# Patient Record
(demographics unavailable — no encounter records)

---

## 2024-12-03 NOTE — PHYSICAL EXAM
[No Acute Distress] : no acute distress [Normal Oropharynx] : normal oropharynx [Normal Appearance] : normal appearance [No Neck Mass] : no neck mass [Normal Rate/Rhythm] : normal rate/rhythm [Normal S1, S2] : normal s1, s2 [No Murmurs] : no murmurs [No Resp Distress] : no resp distress [Clear to Auscultation Bilaterally] : clear to auscultation bilaterally [Normal to Percussion] : normal to percussion [No Abnormalities] : no abnormalities [Benign] : benign [Not Tender] : not tender [Normal Gait] : normal gait [No Clubbing] : no clubbing [No Cyanosis] : no cyanosis [No Edema] : no edema [Normal Color/ Pigmentation] : normal color/ pigmentation [No Focal Deficits] : no focal deficits [Oriented x3] : oriented x3 [Normal Affect] : normal affect [TextBox_2] : Overweight

## 2024-12-03 NOTE — CONSULT LETTER
[Dear  ___] : Dear ~CAPO, [Consult Letter:] : I had the pleasure of evaluating your patient, [unfilled]. [Consult Closing:] : Thank you very much for allowing me to participate in the care of this patient.  If you have any questions, please do not hesitate to contact me. [Sincerely,] : Sincerely, [FreeTextEntry2] : Abbe Bird MD [FreeTextEntry3] : [facility]

## 2024-12-03 NOTE — HISTORY OF PRESENT ILLNESS
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 No recent resp. infections.  got flu and other vaccines   [TextBox_11] : 1 [TextBox_13] : 40 [YearQuit] : 2021

## 2024-12-03 NOTE — DISCUSSION/SUMMARY
[FreeTextEntry1] : Pulmonary nodules.  Large nodules most likely intrapulmonary lymph nodes based upon location and appearance.  Questionable increase in size. Reticular opacities mildly increased compared to September 2022.  Consider combined pulmonary fibrosis emphysema syndrome.  Consider interstitial lung disease.  No definitive evidence of rheumatic disease.  Clinically stable but decrease in function. Asbestos exposure probable. Restrictive physiology more likely related to body habitus than interstitial disease.  Both may be contributory. Former smoker

## 2024-12-03 NOTE — PROCEDURE
[FreeTextEntry1] : Rheumatology note reviewed and appreciated  12/03/2024 Pulmonary function testing There is a moderate ventilatory impairment in a combined obstructive and restrictive pattern. There is elevation in the RV/TLC ratio indicative of possible air trapping. There is a mild diffusion impairment.  Mild decrease in function compared to June 2024.    1 / 1 Wilbur Shook  Report date:11/2/2023 View Order (Report matches exam selected in Patient History pane)     ACC: [id number] EXAM: CT CHEST ORDERED BY: IVAN TAYLOR  PROCEDURE DATE: 10/26/2023    INTERPRETATION: Clinical indication: Follow-up of lung nodule, history of smoking.  Axial CT images of the chest are obtained without intravenous administration of contrast.  Comparison is made with the prior chest CT of September 20, 2022.  No enlarged axillary lymph nodes. Multiple small or benign appearing mediastinal and hilar lymph nodes are without significant interval change.  Heart size is normal. No pericardial or pleural effusions. Vascular calcifications with involvement of the aorta and the coronary arteries.  Evaluation of the upper abdomen demonstrate cholelithiasis. Relative decreased density of the liver as compared to the spleen suggestive of hepatic steatosis.  Evaluation of the lungs interval resolution of the previously seen clustered bilateral lower lobe and right upper lobe clustered tiny nodules due to impacted distal airways since September 20, 2022.  5 mm left lower lobe fissural nodule on image 85 of series 3 demonstrate minimal interval increase in size since September 20, 2022 given differences in technique. Right-sided 7 mm fissural nodule on image 86 of series 3 also has slightly increased in size. The other discrete appearing lung nodules largest within the left lower lobe measuring about 7 mm are unchanged.  Mild bilateral lower lung peripheral reticular or groundglass opacities within the lower lobes, right middle lobe and the lingula have slightly increased since September 20, 2022, of unclear etiology may be due to mild interstitial lung disease. Peripheral reticular or groundglass opacities within the dependent portions of the lower lobes in a paraspinal location are new since the prior study may be due to dependent atelectasis or mild interstitial lung disease.  No bronchiectasis or honeycombing.  No central endobronchial lesions. Saber-sheath configuration of the trachea related to the given history of smoking.  Degenerative changes of the spine.  IMPRESSION: Interval resolution of the previously seen bilateral clusters of impacted distal airways since September 20, 2022.  Mild bilateral mid to lower lung peripheral reticular or groundglass opacities which are either new or demonstrate interval slight progression since September 20, 2022 can be seen in the setting of interstitial lung disease.  2 bilateral fissural subcentimeter nodules possibly intrafissural lymph nodes slightly increased in size since September 20, 2022 and may be due to the increased conspicuity of the peripheral reticular opacities. Other bilateral discrete appearing subcentimeter pulmonary nodules are unchanged.  A 3 month follow-up noncontrast chest CT is recommended for complete evaluation of the above findings.  --- End of Report ---      WILBUR SHOOK MD; Attending Radiologist This document has been electronically signed. Nov 2 2023 3:58PM

## 2024-12-03 NOTE — CONSULT LETTER
[Dear  ___] : Dear ~CAPO, [Consult Letter:] : I had the pleasure of evaluating your patient, [unfilled]. [Consult Closing:] : Thank you very much for allowing me to participate in the care of this patient.  If you have any questions, please do not hesitate to contact me. [Sincerely,] : Sincerely, [FreeTextEntry2] : Abbe Bird MD [FreeTextEntry3] : Americ[facility]

## 2024-12-03 NOTE — PROCEDURE
[FreeTextEntry1] : Rheumatology note reviewed and appreciated  12/03/2024 Pulmonary function testing There is a moderate ventilatory impairment in a combined obstructive and restrictive pattern. There is elevation in the RV/TLC ratio indicative of possible air trapping. There is a mild diffusion impairment.  Mild decrease in function compared to June 2024.    1 / 1 Wilbur Shook  Report date:11/2/2023 View Order (Report matches exam selected in Patient History pane)     ACC: [id number] EXAM: CT CHEST ORDERED BY: IVAN TAYLOR  PROCEDURE DATE: 10/26/2023    INTERPRETATION: Clinical indication: Follow-up of lung nodule, history of smoking.  Axial CT images of the chest are obtained without intravenous administration of contrast.  Comparison is made with the prior chest CT of September 20, 2022.  No enlarged axillary lymph nodes. Multiple small or benign appearing mediastinal and hilar lymph nodes are without significant interval change.  Heart size is normal. No pericardial or pleural effusions. Vascular calcifications with involvement of the aorta and the coronary arteries.  Evaluation of the upper abdomen demonstrate cholelithiasis. Relative decreased density of the liver as compared to the spleen suggestive of hepatic steatosis.  Evaluation of the lungs interval resolution of the previously seen clustered bilateral lower lobe and right upper lobe clustered tiny nodules due to impacted distal airways since September 20, 2022.  5 mm left lower lobe fissural nodule on image 85 of series 3 demonstrate minimal interval increase in size since September 20, 2022 given differences in technique. Right-sided 7 mm fissural nodule on image 86 of series 3 also has slightly increased in size. The other discrete appearing lung nodules largest within the left lower lobe measuring about 7 mm are unchanged.  Mild bilateral lower lung peripheral reticular or groundglass opacities within the lower lobes, right middle lobe and the lingula have slightly increased since September 20, 2022, of unclear etiology may be due to mild interstitial lung disease. Peripheral reticular or groundglass opacities within the dependent portions of the lower lobes in a paraspinal location are new since the prior study may be due to dependent atelectasis or mild interstitial lung disease.  No bronchiectasis or honeycombing.  No central endobronchial lesions. Saber-sheath configuration of the trachea related to the given history of smoking.  Degenerative changes of the spine.  IMPRESSION: Interval resolution of the previously seen bilateral clusters of impacted distal airways since September 20, 2022.  Mild bilateral mid to lower lung peripheral reticular or groundglass opacities which are either new or demonstrate interval slight progression since September 20, 2022 can be seen in the setting of interstitial lung disease.  2 bilateral fissural subcentimeter nodules possibly intrafissural lymph nodes slightly increased in size since September 20, 2022 and may be due to the increased conspicuity of the peripheral reticular opacities. Other bilateral discrete appearing subcentimeter pulmonary nodules are unchanged.  A 3 month follow-up noncontrast chest CT is recommended for complete evaluation of the above findings.  --- End of Report ---      WILBUR SHOOK MD; Attending Radiologist This document has been electronically signed. Nov 2 2023 3:58PM

## 2024-12-03 NOTE — ASSESSMENT
[FreeTextEntry1] : Follow-up CAT scan.  High-resolution CT. Patient would benefit from program of exercise and weight loss. Continue CPAP. Will review CT and make further recommendations. Follow-up in 3 months or sooner on a as needed basis. All discussed with patient.   35 minutes spent in evaluation management and review of studies.

## 2025-03-03 NOTE — CONSULT LETTER
[Dear  ___] : Dear ~CAPO, [Consult Letter:] : I had the pleasure of evaluating your patient, [unfilled]. [Consult Closing:] : Thank you very much for allowing me to participate in the care of this patient.  If you have any questions, please do not hesitate to contact me. [Sincerely,] : Sincerely, [FreeTextEntry2] : Abbe Bird MD [FreeTextEntry3] : Americo Espinoza [facility]

## 2025-03-03 NOTE — ASSESSMENT
[FreeTextEntry1] : Patient would benefit from program of exercise and weight loss. Continue CPAP. CT 1 year.  Task sent as reminder. Follow-up in 6 months or sooner on a as needed basis. All discussed with patient.

## 2025-03-03 NOTE — HISTORY OF PRESENT ILLNESS
[Former] : former [TextBox_4] : Here for f/u  after last visit had HRCT 1/2/25 feels sob has been stable gets cpap via cardio Dr Bird using nightly Hb 14.2 improved on Cpap gets sob with 2 flights of stairs, no changeNo significant cough, wheezing, chest pain. No recent resp. infections.  got flu and other vaccines   [TextBox_11] : 1 [TextBox_13] : 40 [YearQuit] : 2021

## 2025-03-03 NOTE — DISCUSSION/SUMMARY
[FreeTextEntry1] : Pulmonary nodules.  Large nodules most likely intrapulmonary lymph nodes based upon location and appearance.  Appears stable. Reticular opacities mildly increased compared to September 2022.  Consider combined pulmonary fibrosis emphysema syndrome.  Consider interstitial lung disease.  No definitive evidence of rheumatic disease.  Presently clinically and functionally stable. Asbestos exposure probable. Groundglass opacities.  More likely related to inhomogeneous ventilation then active infectious or inflammatory process.  Decreased. Restrictive physiology more likely related to body habitus than interstitial disease.  Both may be contributory. Former smoker

## 2025-03-03 NOTE — PROCEDURE
[FreeTextEntry1] : Rheumatology note reviewed and appreciated  High resolution Ct chest 1/2/25 reviewed and discussed.  03/03/2025 Pulmonary function testing There is a mild-mod ventilatory impairment in a combined obstructive and restrictive pattern Normal Lung Volumes. Diffusion capacity is normal.  Compared to December 2, 2024 there is a significant increase in flow rates.  There is an increase in diffusion capacity.     1 / 1 Wilbur Shook  Report date:11/2/2023 View Order (Report matches exam selected in Patient History pane)     ACC: [id number] EXAM: CT CHEST ORDERED BY: IVAN TAYLOR  PROCEDURE DATE: 10/26/2023    INTERPRETATION: Clinical indication: Follow-up of lung nodule, history of smoking.  Axial CT images of the chest are obtained without intravenous administration of contrast.  Comparison is made with the prior chest CT of September 20, 2022.  No enlarged axillary lymph nodes. Multiple small or benign appearing mediastinal and hilar lymph nodes are without significant interval change.  Heart size is normal. No pericardial or pleural effusions. Vascular calcifications with involvement of the aorta and the coronary arteries.  Evaluation of the upper abdomen demonstrate cholelithiasis. Relative decreased density of the liver as compared to the spleen suggestive of hepatic steatosis.  Evaluation of the lungs interval resolution of the previously seen clustered bilateral lower lobe and right upper lobe clustered tiny nodules due to impacted distal airways since September 20, 2022.  5 mm left lower lobe fissural nodule on image 85 of series 3 demonstrate minimal interval increase in size since September 20, 2022 given differences in technique. Right-sided 7 mm fissural nodule on image 86 of series 3 also has slightly increased in size. The other discrete appearing lung nodules largest within the left lower lobe measuring about 7 mm are unchanged.  Mild bilateral lower lung peripheral reticular or groundglass opacities within the lower lobes, right middle lobe and the lingula have slightly increased since September 20, 2022, of unclear etiology may be due to mild interstitial lung disease. Peripheral reticular or groundglass opacities within the dependent portions of the lower lobes in a paraspinal location are new since the prior study may be due to dependent atelectasis or mild interstitial lung disease.  No bronchiectasis or honeycombing.  No central endobronchial lesions. Saber-sheath configuration of the trachea related to the given history of smoking.  Degenerative changes of the spine.  IMPRESSION: Interval resolution of the previously seen bilateral clusters of impacted distal airways since September 20, 2022.  Mild bilateral mid to lower lung peripheral reticular or groundglass opacities which are either new or demonstrate interval slight progression since September 20, 2022 can be seen in the setting of interstitial lung disease.  2 bilateral fissural subcentimeter nodules possibly intrafissural lymph nodes slightly increased in size since September 20, 2022 and may be due to the increased conspicuity of the peripheral reticular opacities. Other bilateral discrete appearing subcentimeter pulmonary nodules are unchanged.  A 3 month follow-up noncontrast chest CT is recommended for complete evaluation of the above findings.  --- End of Report ---      WILBUR SHOOK MD; Attending Radiologist This document has been electronically signed. Nov 2 2023 3:58PM